# Patient Record
Sex: FEMALE | Race: WHITE
[De-identification: names, ages, dates, MRNs, and addresses within clinical notes are randomized per-mention and may not be internally consistent; named-entity substitution may affect disease eponyms.]

---

## 2019-11-12 ENCOUNTER — HOSPITAL ENCOUNTER (EMERGENCY)
Dept: HOSPITAL 61 - ER | Age: 24
Discharge: HOME | End: 2019-11-12
Payer: COMMERCIAL

## 2019-11-12 VITALS — BODY MASS INDEX: 28.93 KG/M2 | WEIGHT: 180 LBS | HEIGHT: 66 IN

## 2019-11-12 VITALS — DIASTOLIC BLOOD PRESSURE: 95 MMHG | SYSTOLIC BLOOD PRESSURE: 146 MMHG
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
## 2019-11-12 DIAGNOSIS — S29.012A: Primary | ICD-10-CM

## 2019-11-12 DIAGNOSIS — Z88.1: ICD-10-CM
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
## 2019-11-12 DIAGNOSIS — Z88.8: ICD-10-CM
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
## 2019-11-12 PROCEDURE — 99284 EMERGENCY DEPT VISIT MOD MDM: CPT

## 2019-11-12 NOTE — PHYS DOC
Past Medical History


Past Medical History:  No Pertinent History


Past Surgical History:  Other


Alcohol Use:  None


Drug Use:  None





Adult General


Chief Complaint


Chief Complaint:  BACK PAIN OR INJURY





HPI


HPI





Patient is a 24  year old female with no significant medical history who 

presents to the ED today complaining of 8 out of 10 throbbing intermittent mid 

back pain with spasms that began yesterday after she bent over to pick something

"light" from the floor. Patient denies falling. Denies any pain radiating to 

bilateral upper or lower extremities. Denies any numbness or tingling to bila

teral upper and lower extremities. Denies any loss of bowel/bladder function. 

She states the pain is worse when she tries to lift heavy items. She states at 

work she is required to lift heavy things and this is concerning to her because 

she cannot return to work and be able to lift anything heavy today. She works 

for [employer].





Review of Systems


Review of Systems





Constitutional: Denies fever or chills []


GI: Denies abdominal pain, nausea, vomiting, bloody stools or diarrhea []


: Denies dysuria or hematuria []


Musculoskeletal: Reports mid back pain


Integument: Denies rash or skin lesions []


Neurologic: Denies headache, focal weakness or sensory changes []








All other systems were reviewed and found to be within normal limits, except as 

documented in this note.





Allergies


Allergies





Allergies








Coded Allergies Type Severity Reaction Last Updated Verified


 


  amoxicillin trihydrate Allergy Unknown  11/12/19 Yes


 


  potassium clavulanate Allergy Unknown  11/12/19 Yes











Physical Exam


Physical Exam





Constitutional: Well developed, well nourished, no acute distress, non-toxic 

appearance. []


Abdomen: Bowel sounds normal, soft, no tenderness, no masses, no pulsatile 

masses. [] 


Skin: Warm, dry, no erythema, no rash. [] 


Back: Diffuse paraspinal muscle tenderness to bilateral thoracic spine, no 

midline thoracic spine tenderness, no CVA tenderness. [] 


Extremities: No tenderness, no cyanosis, no clubbing, ROM intact, no edema. [] 


Neurologic: Alert and oriented X 3, normal motor function, normal sensory 

function, no focal deficits noted. []


Psychologic: Affect normal, judgement normal, mood normal. []





Current Patient Data


Vital Signs





                                   Vital Signs








  Date Time  Temp Pulse Resp B/P (MAP) Pulse Ox O2 Delivery O2 Flow Rate FiO2


 


11/12/19 09:18 98.7 66 18 146/95 (112) 100 Room Air  





 98.7       











EKG


EKG


[]





Radiology/Procedures


Radiology/Procedures


[]





Course & Med Decision Making


Course & Med Decision Making


Pertinent Labs and Imaging studies reviewed. (See chart for details)





This is a 24-year-old female patient presenting to the ED today with thoracic 

strain that began after she bent over to pick something like from the floor 

yesterday. Supportive care measures discussed including the use of a heating 

pad, stretches. Provided a note for work. Follow-up with PCP in 1-2 weeks.





Dragon Disclaimer


Dragon Disclaimer


This electronic medical record was generated, in whole or in part, using a voice

 recognition dictation system.





Departure


Departure


Impression:  


   Primary Impression:  


   Acute thoracic myofascial strain


Disposition:  01 HOME, SELF-CARE


Condition:  STABLE


Referrals:  


UNKNOWN PCP NAME (PCP)


follow up with your doctor in 1-2 weeks


Patient Instructions:  Thoracic Strain, Easy-to-Read





Additional Instructions:  


You were evaluated in the emergency room for thoracic muscle strain. Consider 

using a heating pad, take the prescribed medications as ordered. Follow-up with 

your doctor in one week


Scripts


Cyclobenzaprine Hcl (CYCLOBENZAPRINE HCL) 10 Mg Tablet


1 TAB PO TID, #30 TAB


   Prov: DEB KELLY         11/12/19 


Diclofenac Potassium (DICLOFENAC POTASSIUM) 50 Mg Tablet


1 TAB PO BID, #20 TAB 0 Refills


   Prov: DEB KELLY         11/12/19 


Methylprednisolone (MEDROL) 4 Mg Tab.ds.pk


1 PKG PO UD, #1 PKG


   Prov: DEB KELLY         11/12/19





Problem Qualifiers








   Primary Impression:  


   Acute thoracic myofascial strain


   Encounter type:  initial encounter  Qualified Codes:  S29.019A - Strain of 

   muscle and tendon of unspecified wall of thorax, initial encounter








DEB KELLY              Nov 12, 2019 09:41